# Patient Record
Sex: MALE | Race: WHITE | NOT HISPANIC OR LATINO | Employment: STUDENT | RURAL
[De-identification: names, ages, dates, MRNs, and addresses within clinical notes are randomized per-mention and may not be internally consistent; named-entity substitution may affect disease eponyms.]

---
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
## 2018-10-01 ENCOUNTER — TELEPHONE (OUTPATIENT)
Dept: FAMILY MEDICINE CLINIC | Facility: CLINIC | Age: 18
End: 2018-10-01

## 2018-10-01 NOTE — TELEPHONE ENCOUNTER
He is needing an appt for a physical. 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 Would like to get an appt the end of the week.He has not been here in a long time. Was needing to fill out new Pt  form. She wants to know if she can fill out his form. He is in [place] in school. Also needs the appt for Thurs or Fri

## 2018-10-04 ENCOUNTER — OFFICE VISIT (OUTPATIENT)
Dept: FAMILY MEDICINE CLINIC | Facility: CLINIC | Age: 18
End: 2018-10-04

## 2018-10-04 VITALS
SYSTOLIC BLOOD PRESSURE: 122 MMHG | HEIGHT: 69 IN | OXYGEN SATURATION: 97 % | HEART RATE: 70 BPM | DIASTOLIC BLOOD PRESSURE: 80 MMHG | WEIGHT: 202 LBS | RESPIRATION RATE: 16 BRPM | BODY MASS INDEX: 29.92 KG/M2

## 2018-10-04 DIAGNOSIS — Z00.00 WELLNESS EXAMINATION: Primary | ICD-10-CM

## 2018-10-04 DIAGNOSIS — Z23 NEED FOR VACCINATION: ICD-10-CM

## 2018-10-04 PROCEDURE — 86580 TB INTRADERMAL TEST: CPT | Performed by: FAMILY MEDICINE

## 2018-10-04 PROCEDURE — 99395 PREV VISIT EST AGE 18-39: CPT | Performed by: FAMILY MEDICINE

## 2018-10-04 NOTE — PROGRESS NOTES
"Subjective   Kong Aldana is a 18 y.o. male.     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  No Known Allergies    No past medical history on file.  No past surgical history on file.    Review of Systems   Constitutional: Negative.    HENT: Negative.    Eyes: Negative.    Respiratory: Negative.    Cardiovascular: Negative.    Gastrointestinal: Negative.    Endocrine: Negative.    Genitourinary: Negative.    Musculoskeletal: Negative.    Skin: Negative.    Allergic/Immunologic: Negative.    Neurological: Negative.    Hematological: Negative.    Psychiatric/Behavioral: Negative.        Objective  /80   Pulse 70   Resp 16   Ht 175.3 cm (69\")   Wt 91.6 kg (202 lb)   SpO2 97%   BMI 29.83 kg/m²   Physical Exam   Constitutional: He is oriented to person, place, and time. He appears well-developed and well-nourished.   HENT:   Head: Normocephalic and atraumatic.   Right Ear: External ear normal.   Left Ear: External ear normal.   Nose: Nose normal.   Mouth/Throat: Oropharynx is clear and moist.   Eyes: Pupils are equal, round, and reactive to light. Conjunctivae and EOM are normal.   Neck: Normal range of motion. Neck supple.   Cardiovascular: Normal rate, regular rhythm, normal heart sounds and intact distal pulses.    Pulmonary/Chest: Effort normal and breath sounds normal.   Abdominal: Soft. Bowel sounds are normal.   Musculoskeletal: Normal range of motion.   Neurological: He is alert and oriented to person, place, and time.   Skin: Skin is warm. Capillary refill takes less than 2 seconds.   Psychiatric: He has a normal mood and affect. His behavior is normal. Judgment and thought content normal.   Nursing note and vitals reviewed.      Assessment/Plan   Kong was seen today for establish " care.    Diagnoses and all orders for this visit:    Wellness examination                 No orders of the defined types were placed in this encounter.      Follow up: prn

## 2019-03-22 ENCOUNTER — TELEPHONE (OUTPATIENT)
Dept: FAMILY MEDICINE CLINIC | Facility: CLINIC | Age: 19
End: 2019-03-22

## 2019-03-22 RX ORDER — AMOXICILLIN AND CLAVULANATE POTASSIUM 875; 125 MG/1; MG/1
1 TABLET, FILM COATED ORAL 2 TIMES DAILY
Qty: 20 TABLET | Refills: 0 | OUTPATIENT
Start: 2019-03-22 | End: 2019-12-04

## 2019-12-02 ENCOUNTER — TELEPHONE (OUTPATIENT)
Dept: FAMILY MEDICINE CLINIC | Facility: CLINIC | Age: 19
End: 2019-12-02

## 2019-12-02 NOTE — TELEPHONE ENCOUNTER
Antonette called & said that Kong was seen in [place], [place] and had an U/S.  (the report is in chart)  The report says complex cystic mass w/in subcutaneous tissue of midline in the area of palpable concern.  Also, echogenic liver suggestive of fatty infiltration or hepatocellular disease.  She is req to have you see him this week.

## 2019-12-04 ENCOUNTER — OFFICE VISIT (OUTPATIENT)
Dept: FAMILY MEDICINE CLINIC | Facility: CLINIC | Age: 19
End: 2019-12-04

## 2019-12-04 VITALS
WEIGHT: 222 LBS | HEIGHT: 68 IN | DIASTOLIC BLOOD PRESSURE: 80 MMHG | HEART RATE: 76 BPM | BODY MASS INDEX: 33.65 KG/M2 | OXYGEN SATURATION: 99 % | SYSTOLIC BLOOD PRESSURE: 122 MMHG | RESPIRATION RATE: 16 BRPM

## 2019-12-04 DIAGNOSIS — R22.2 ABDOMINAL WALL MASS: Primary | ICD-10-CM

## 2019-12-04 PROCEDURE — 99213 OFFICE O/P EST LOW 20 MIN: CPT | Performed by: FAMILY MEDICINE

## 2019-12-04 NOTE — PROGRESS NOTES
"Matt Aldana is a 19 y.o. male.     Chief Complaint   Patient presents with   • Follow-up     abdominal U/S        History of Present Illness     notes having a cystic mass on the upper abdomen found on u/s--he notes its been prsent for several weeks---no hx of trama    No current outpatient medications on file.  No Known Allergies    No past medical history on file.  No past surgical history on file.    Review of Systems   Constitutional: Negative.    HENT: Negative.    Eyes: Negative.    Respiratory: Negative.    Cardiovascular: Negative.    Gastrointestinal: Negative.    Endocrine: Negative.    Genitourinary: Negative.    Musculoskeletal: Negative.    Skin: Negative.    Allergic/Immunologic: Negative.    Neurological: Negative.    Hematological: Negative.    Psychiatric/Behavioral: Negative.        Objective  /80   Pulse 76   Resp 16   Ht 172.7 cm (68\")   Wt 101 kg (222 lb)   SpO2 99%   BMI 33.75 kg/m²   Physical Exam   Constitutional: He is oriented to person, place, and time. He appears well-developed and well-nourished.   HENT:   Head: Normocephalic and atraumatic.   Right Ear: External ear normal.   Left Ear: External ear normal.   Nose: Nose normal.   Mouth/Throat: Oropharynx is clear and moist.   Eyes: Conjunctivae and EOM are normal. Pupils are equal, round, and reactive to light.   Neck: Normal range of motion. Neck supple.   Cardiovascular: Normal rate, regular rhythm, normal heart sounds and intact distal pulses.   Pulmonary/Chest: Effort normal and breath sounds normal.   Abdominal: Soft. Bowel sounds are normal.   Musculoskeletal: Normal range of motion.   Neurological: He is alert and oriented to person, place, and time.   Skin: Skin is warm and dry. Capillary refill takes less than 2 seconds.   Psychiatric: He has a normal mood and affect. His behavior is normal. Judgment and thought content normal.   Nursing note and vitals reviewed.  exam does confirm 2cm freely movable " anterior abdominal mass--tender    Assessment/Plan   Kong was seen today for follow-up.    Diagnoses and all orders for this visit:    Abdominal wall mass  -     Ambulatory Referral to General Surgery                 Orders Placed This Encounter   Procedures   • Ambulatory Referral to General Surgery     Referral Priority:   Urgent     Referral Type:   Consultation     Referral Reason:   Specialty Services Required     Requested Specialty:   General Surgery     Number of Visits Requested:   1       Follow up: prn

## 2019-12-20 ENCOUNTER — LAB REQUISITION (OUTPATIENT)
Dept: LAB | Facility: HOSPITAL | Age: 19
End: 2019-12-20

## 2019-12-20 DIAGNOSIS — Z00.00 ENCOUNTER FOR GENERAL ADULT MEDICAL EXAMINATION WITHOUT ABNORMAL FINDINGS: ICD-10-CM

## 2019-12-20 PROCEDURE — 88307 TISSUE EXAM BY PATHOLOGIST: CPT | Performed by: SPECIALIST

## 2019-12-20 PROCEDURE — 88312 SPECIAL STAINS GROUP 1: CPT | Performed by: SPECIALIST

## 2019-12-24 LAB
CYTO UR: NORMAL
LAB AP CASE REPORT: NORMAL
LAB AP CLINICAL INFORMATION: NORMAL
PATH REPORT.FINAL DX SPEC: NORMAL
PATH REPORT.GROSS SPEC: NORMAL

## 2020-06-22 ENCOUNTER — TELEPHONE (OUTPATIENT)
Dept: FAMILY MEDICINE CLINIC | Facility: CLINIC | Age: 20
End: 2020-06-22

## 2020-06-22 RX ORDER — SCOLOPAMINE TRANSDERMAL SYSTEM 1 MG/1
1 PATCH, EXTENDED RELEASE TRANSDERMAL
Qty: 4 EACH | Refills: 0 | Status: SHIPPED | OUTPATIENT
Start: 2020-06-22 | End: 2021-01-18

## 2020-06-22 NOTE — TELEPHONE ENCOUNTER
Caller: Wagner Aldana    Relationship: Father    Best call back number: [phone]    What medication are you requesting:   Patches that patient believes are Dramamine patches     What are your current symptoms:   N/a precaution for upcoming trip    How long have you been experiencing symptoms:   n/a    Have you had these symptoms before:    [x] Yes  [] No    Have you been treated for these symptoms before:   [x] Yes  [] No    If a prescription is needed, what is your preferred pharmacy and phone number:      [facility]    Additional notes:  Patient said he is going on vacation and was provided these patches which helped and patient wanted to request these again.

## 2021-01-15 ENCOUNTER — CLINICAL SUPPORT (OUTPATIENT)
Dept: FAMILY MEDICINE CLINIC | Facility: CLINIC | Age: 21
End: 2021-01-15

## 2021-01-15 DIAGNOSIS — Z00.00 WELLNESS EXAMINATION: Primary | ICD-10-CM

## 2021-01-15 LAB
INDURATION: 0 MM (ref 0–10)
Lab: NORMAL
Lab: NORMAL
TB SKIN TEST: NORMAL

## 2021-01-15 PROCEDURE — 86580 TB INTRADERMAL TEST: CPT | Performed by: FAMILY MEDICINE

## 2021-01-15 NOTE — PROGRESS NOTES
TB skin test done as ordered on the right fore arm. Pt was told to return Monday morning to reading of the site.

## 2021-01-18 ENCOUNTER — OFFICE VISIT (OUTPATIENT)
Dept: FAMILY MEDICINE CLINIC | Facility: CLINIC | Age: 21
End: 2021-01-18

## 2021-01-18 VITALS
OXYGEN SATURATION: 98 % | WEIGHT: 226 LBS | BODY MASS INDEX: 34.25 KG/M2 | TEMPERATURE: 97.4 F | HEART RATE: 65 BPM | RESPIRATION RATE: 16 BRPM | DIASTOLIC BLOOD PRESSURE: 80 MMHG | HEIGHT: 68 IN | SYSTOLIC BLOOD PRESSURE: 118 MMHG

## 2021-01-18 DIAGNOSIS — Z02.89 ENCOUNTER FOR PHYSICAL EXAMINATION RELATED TO EMPLOYMENT: Primary | ICD-10-CM

## 2021-01-18 PROCEDURE — 99395 PREV VISIT EST AGE 18-39: CPT | Performed by: NURSE PRACTITIONER

## 2021-01-18 NOTE — PROGRESS NOTES
Subjective   Chief Complaint:  Work Physical    History of Present Illness:  This 20 y.o. male was seen in the office today.  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  He is in college for teaching.  No PMSH, no high risk behaviors.      No Known Allergies   Current Outpatient Medications on File Prior to Visit   Medication Sig   • [DISCONTINUED] Scopolamine (Transderm-Scop, 1.5 MG,) 1.5 MG/3DAYS patch Place 1 patch on the skin as directed by provider Every 72 (Seventy-Two) Hours.     No current facility-administered medications on file prior to visit.       Past Medical, Surgical, Social, and Family History:  History reviewed. No pertinent past medical history.  History reviewed. No pertinent surgical history.  Social History     Socioeconomic History   • Marital status: Single     Spouse name: Not on file   • Number of children: Not on file   • Years of education: Not on file   • Highest education level: Not on file   Tobacco Use   • Smoking status: Never Smoker   • Smokeless tobacco: Never Used     Review of Systems   Constitutional: Negative for chills, diaphoresis, fatigue and fever.   HENT: Negative for congestion, ear pain, sinus pressure and sinus pain.    Eyes: Negative for pain, discharge, redness and itching.   Respiratory: Negative for cough, shortness of breath and wheezing.    Cardiovascular: Negative for chest pain, palpitations and leg swelling.   Gastrointestinal: Negative for abdominal distention, abdominal pain, constipation, diarrhea and nausea.   Endocrine: Negative for cold intolerance and heat intolerance.   Genitourinary: Negative for difficulty urinating, dysuria, flank pain, frequency and urgency.   Musculoskeletal: Negative for arthralgias and myalgias.   Skin: Negative for rash and wound.   Allergic/Immunologic: Negative for environmental allergies.   Neurological: Negative for dizziness, weakness, numbness and headaches.   Hematological: Negative for adenopathy. Does not  "bruise/bleed easily.   Psychiatric/Behavioral: Negative for agitation, behavioral problems, sleep disturbance and suicidal ideas.       History reviewed. No pertinent family history.  Objective   Physical Exam  Vitals signs and nursing note reviewed.   Constitutional:       General: He is not in acute distress.     Appearance: He is not diaphoretic.   HENT:      Head: Normocephalic and atraumatic.      Nose: Nose normal.   Eyes:      Conjunctiva/sclera: Conjunctivae normal.      Pupils: Pupils are equal, round, and reactive to light.   Neck:      Musculoskeletal: Normal range of motion and neck supple.      Thyroid: No thyromegaly.      Vascular: No JVD.      Trachea: No tracheal deviation.   Cardiovascular:      Rate and Rhythm: Normal rate and regular rhythm.      Heart sounds: Normal heart sounds. No murmur. No friction rub. No gallop.    Pulmonary:      Effort: Pulmonary effort is normal. No respiratory distress.      Breath sounds: Normal breath sounds. No wheezing.   Abdominal:      General: Bowel sounds are normal.      Palpations: Abdomen is soft.      Tenderness: There is no abdominal tenderness. There is no guarding.   Musculoskeletal: Normal range of motion.         General: No tenderness or deformity.   Lymphadenopathy:      Cervical: No cervical adenopathy.   Skin:     General: Skin is warm and dry.      Capillary Refill: Capillary refill takes less than 2 seconds.   Neurological:      Mental Status: He is alert and oriented to person, place, and time.   Psychiatric:         Behavior: Behavior normal.         Thought Content: Thought content normal.         Judgment: Judgment normal.     /80   Pulse 65   Temp 97.4 °F (36.3 °C) (Tympanic)   Resp 16   Ht 172.7 cm (68\")   Wt 103 kg (226 lb)   SpO2 98%   BMI 34.36 kg/m²     Assessment/Plan   Diagnoses and all orders for this visit:    1. Encounter for physical examination related to employment (Primary)    Discussion:  Advised and educated plan " of care.  No disqualifying factors for employment.    Anticipatory guidance: Diet-discussed lose it mary anne, safety, need for lipid monitoring in 20s as a baseline, health while away to college-binge drinking, substance use.    Follow-up:  Return if symptoms worsen or fail to improve.    Note e-Signed by LUNA Shore on 01/18/2021 at 08:31 CST

## 2021-08-05 ENCOUNTER — TELEPHONE (OUTPATIENT)
Dept: FAMILY MEDICINE CLINIC | Facility: CLINIC | Age: 21
End: 2021-08-05

## 2021-08-11 ENCOUNTER — OFFICE VISIT (OUTPATIENT)
Dept: FAMILY MEDICINE CLINIC | Facility: CLINIC | Age: 21
End: 2021-08-11

## 2021-08-11 VITALS — BODY MASS INDEX: 34.1 KG/M2 | HEIGHT: 68 IN | WEIGHT: 225 LBS | HEART RATE: 70 BPM | RESPIRATION RATE: 18 BRPM

## 2021-08-11 DIAGNOSIS — B07.0 PLANTAR WART OF BOTH FEET: Primary | ICD-10-CM

## 2021-08-11 PROCEDURE — 99214 OFFICE O/P EST MOD 30 MIN: CPT | Performed by: NURSE PRACTITIONER

## 2021-08-11 PROCEDURE — 17110 DESTRUCTION B9 LES UP TO 14: CPT | Performed by: NURSE PRACTITIONER

## 2021-08-11 NOTE — PROGRESS NOTES
"Subjective   Chief Complaint:  Plantar wart treatment    History of Present Illness:  This 21 y.o. male was seen in the office today.  He presents today for treatment of plantar warts.  He presents with bilateral feet plantar warts cute onset several months ago.    No Known Allergies   No current outpatient medications on file prior to visit.     No current facility-administered medications on file prior to visit.      Past Medical, Surgical, Social, and Family History:  History reviewed. No pertinent past medical history.  History reviewed. No pertinent surgical history.  Social History     Socioeconomic History   • Marital status: Single     Spouse name: Not on file   • Number of children: Not on file   • Years of education: Not on file   • Highest education level: Not on file   Tobacco Use   • Smoking status: Never Smoker   • Smokeless tobacco: Never Used     History reviewed. No pertinent family history.  Objective   Physical Exam  Constitutional:       General: He is not in acute distress.  Cardiovascular:      Rate and Rhythm: Normal rate and regular rhythm.   Pulmonary:      Effort: Pulmonary effort is normal.      Breath sounds: Normal breath sounds.   Musculoskeletal:        Feet:    Feet:      Comments: Left foot - pared and currette used as marked  Right foot - cryo used as marked  Neurological:      Mental Status: He is alert.     Pulse 70   Resp 18   Ht 172.7 cm (68\")   Wt 102 kg (225 lb)   BMI 34.21 kg/m²     Assessment/Plan   Diagnoses and all orders for this visit:    1. Plantar wart of both feet (Primary)    Discussion:  Advised and educated plan of care.  Advised can try zinc and vitamin C to help boost immune system over the next couple weeks.  We will see him back in 2weeks for retreatment for residual lesions.    Combination of cryotherapy used on the smaller lesions, larger lesions were pard with a 10 blade scalpel and scraped out with a 2 mm curette.  He tolerated the procedure well.  He " had premedicated with strawberry [place] BLT cream, did not report any pain during the procedure.  0 blood loss.    Time =35 minutes.    Follow-up:  Return in about 2 weeks (around 8/25/2021) for Re-treatment - 30 minute slot.    Electronically signed by LUNA Shore, 08/11/21, 10:07 AM CDT.

## 2021-09-22 ENCOUNTER — TELEPHONE (OUTPATIENT)
Dept: FAMILY MEDICINE CLINIC | Facility: CLINIC | Age: 21
End: 2021-09-22

## 2021-09-22 NOTE — TELEPHONE ENCOUNTER
Telephone: Patient requesting a copy of the last physical we did-advised patient he can come by office and  a copy.

## 2022-05-30 ENCOUNTER — DOCUMENTATION (OUTPATIENT)
Dept: FAMILY MEDICINE CLINIC | Facility: CLINIC | Age: 22
End: 2022-05-30

## 2022-05-30 RX ORDER — AMOXICILLIN AND CLAVULANATE POTASSIUM 875; 125 MG/1; MG/1
1 TABLET, FILM COATED ORAL 2 TIMES DAILY
Qty: 20 TABLET | Refills: 0 | Status: SHIPPED | OUTPATIENT
Start: 2022-05-30 | End: 2022-06-09

## 2022-05-30 RX ORDER — METHYLPREDNISOLONE 4 MG/1
TABLET ORAL
Qty: 1 EACH | Refills: 0 | Status: SHIPPED | OUTPATIENT
Start: 2022-05-30

## 2022-05-31 NOTE — PROGRESS NOTES
Weekend-going out of town-reports sore throat, upper respiratory symptoms-antibiotic and steroid sent.  Cotreatment family -all of which tested negative for COVID thus far.    Electronically signed by LUNA Shore, 05/31/22, 7:24 AM CDT.

## 2022-08-17 RX ORDER — TRIAMCINOLONE ACETONIDE 5 MG/G
1 CREAM TOPICAL 2 TIMES DAILY
Qty: 15 G | Refills: 0 | Status: SHIPPED | OUTPATIENT
Start: 2022-08-17

## 2022-08-17 NOTE — PROGRESS NOTES
Telephone: Reports insect bite that is having difficulty healing and increased itching-steroid cream sent in.  Advised follow-up as needed.    Electronically signed by LUNA Shore, 08/17/22, 10:37 AM CDT.

## 2023-08-19 ENCOUNTER — E-VISIT (OUTPATIENT)
Dept: FAMILY MEDICINE CLINIC | Facility: TELEHEALTH | Age: 23
End: 2023-08-19
Payer: COMMERCIAL

## 2023-08-19 ENCOUNTER — TELEMEDICINE (OUTPATIENT)
Dept: FAMILY MEDICINE CLINIC | Facility: TELEHEALTH | Age: 23
End: 2023-08-19
Payer: COMMERCIAL

## 2023-08-19 DIAGNOSIS — J03.90 TONSILLITIS: Primary | ICD-10-CM

## 2023-08-19 RX ORDER — AMOXICILLIN 500 MG/1
500 CAPSULE ORAL 2 TIMES DAILY
Qty: 20 CAPSULE | Refills: 0 | Status: SHIPPED | OUTPATIENT
Start: 2023-08-19 | End: 2023-08-29

## 2023-08-19 NOTE — PROGRESS NOTES
Chief Complaint   Patient presents with    Sore Throat    Earache       Video Visit Reason:   Free Text Description: Sore throat, ear pain when I swallow  Subjective   Kong Aldana is a 23 y.o. male.     History of Present Illness  Sore throat, worse on the right with right sided ear pain for 2 days. He has not felt feverish. He has had strep throat before and says this feels exactly like that. He has no shortness of breath or wheezing.  Sore Throat   This is a new problem. Episode onset: 2 days. The problem has been gradually worsening. The pain is moderate. Associated symptoms include ear pain. Pertinent negatives include no congestion, coughing, ear discharge, hoarse voice, plugged ear sensation, neck pain, shortness of breath, swollen glands or trouble swallowing (painful swallowing). He has had no exposure to strep.   Earache   There is pain in the right ear. The pain is moderate. Associated symptoms include a sore throat. Pertinent negatives include no coughing, ear discharge, neck pain or rhinorrhea. He has tried acetaminophen for the symptoms. The treatment provided no relief.     The following portions of the patient's history were reviewed and updated as appropriate: allergies, current medications, past medical history, and problem list.      History reviewed. No pertinent past medical history.  Social History     Socioeconomic History    Marital status:    Tobacco Use    Smoking status: Never    Smokeless tobacco: Never     medication documentation: reviewed and updated with patient and   Current Outpatient Medications:     amoxicillin (AMOXIL) 500 MG capsule, Take 1 capsule by mouth 2 (Two) Times a Day for 10 days., Disp: 20 capsule, Rfl: 0  Review of Systems   Constitutional:  Negative for chills and fever.   HENT:  Positive for ear pain and sore throat. Negative for congestion, ear discharge, hoarse voice, postnasal drip, rhinorrhea, sinus pressure, sinus pain, trouble swallowing (painful  swallowing) and voice change.    Respiratory:  Negative for cough and shortness of breath.    Musculoskeletal:  Negative for neck pain.   Allergic/Immunologic: Negative for environmental allergies.     Objective   Physical Exam  Constitutional:       General: He is not in acute distress.     Appearance: He is not ill-appearing.   HENT:      Mouth/Throat:      Pharynx: Uvula midline. Posterior oropharyngeal erythema present. No oropharyngeal exudate.      Tonsils: 2+ on the right. 2+ on the left.      Comments: Tonsillar erythema, significant  Lymphadenopathy:      Head:      Right side of head: No tonsillar adenopathy.      Left side of head: No tonsillar adenopathy.      Cervical: No cervical adenopathy (patient guided exam).   Neurological:      Mental Status: He is alert.       Assessment & Plan   Diagnoses and all orders for this visit:    1. Tonsillitis (Primary)  -     amoxicillin (AMOXIL) 500 MG capsule; Take 1 capsule by mouth 2 (Two) Times a Day for 10 days.  Dispense: 20 capsule; Refill: 0                    Follow Up:  If your symptoms are not resolving by the completion of your treatment or are worsening, see your primary care provider for follow up. If you don't have a primary care provider, you may go to any Urgent Care for re-evaluation. If you develop any life threatening symptoms, go to the nearest Emergency Department immediately or call EMS.               The use of  Video Visit was utilized during this visit, using both Fairphone and Twilio/Epic. The use of a video visit has been reviewed with the patient and verbal informed consent has been obtained. No technical difficulties occurred during the visit.    is located at [address] [place] [address]  Provider is located at [place], [place]

## 2023-08-19 NOTE — E-VISIT ESCALATED
Patient escalated   Provider LUNA Mcallister chose to escalate patient to another level of care because: Patient's free text comments   Additional Details: thinks he has strep throat, no fever, needs throat assessed   Patient was sent the following message:   Based on the information you've provided us, you need to take another step to get care. You mentioned that you think that you have Strep throat, however, you do not have a fever or any other symptoms. A provider needs to look at your throat. Sign in for a video visit so that your throat can be assessed.   What to do now:   Setup a video visit   Please, schedule your video visit   Video visit     You won't be charged for your eVisit. If you paid with a credit card, the charge will be reversed.   Chief Complaint: Cold, flu, COVID, sinus, hay fever, or seasonal allergies   Patient introduction   Patient is 23-year-old male with sore throat that started 3 to 5 days ago. Regarding date of symptom onset, patient writes: 05/16/2023.   COVID-19 testing history, vaccination status, and exposure:    Has not been tested for COVID-19 since symptom onset.    Patient was prompted to take a self-test during the interview, but does not have a COVID-19 test kit.    Received 2 doses of the COVID-19 vaccine (Moderna, Moderna). Received their most recent dose of the vaccine more than 14 days ago.    No known exposure to a person with a confirmed or suspected case of COVID-19.    No travel outside local community within the last 14 days.   Risk factors for severe disease from COVID-19 infection    BMI >= 25.   General presentation   Symptoms came on gradually.   Fever:    No fever.   Sinus and nasal symptoms:    Sinus pain or pressure on or around the forehead.    Patient first noticed sinus pain less than 5 days ago.    Sinus pain is not worse with Valsalva.    No nasal or sinus congestion.    No nasal discharge.    No itchy nose or sneezing.    No history of unhealed nasal  septal ulcer/nasal wound.    No history of antibiotic treatment for sinus infection in the last year.    No history of deviated septum or nasal polyps.   Throat symptoms:    Sore throat. Pain is bilateral but worse on the right side.    No known recent strep exposure.    Patient thinks they have strep.    Has discomfort when swallowing but can swallow liquids and solid foods.   Head and body aches:    No headache.    No sweats.    No chills.    No myalgia.    No fatigue.   Cough:    No cough.   Wheezing and shortness of breath:    No COPD diagnosis.    No asthma diagnosis.    No wheezing.    No shortness of breath.   Chest pain:    No chest pain.   Ear symptoms:    Current symptoms include pain in the ear(s).   Dizziness:    No dizziness.   Allergies:    No history of allergies.   Flu exposure:    No recent known exposure to a person with a confirmed flu diagnosis.    Has not had a flu vaccine this season.   Patient is taking over-the-counter medications for current symptoms, including acetaminophen.   Review of red flags/alarm symptoms:    No changes in alertness or awareness.    No nuchal rigidity.    No symptoms suggesting airway obstruction.    No decreased urination.   Self-exam:    Height: 175 centimeters    Weight: 99.7 kilograms    No difficulty moving their chin toward their chest.    No tonsillar edema.    Purulent tonsils.    No palatal petechiae.    Neck lymph nodes feel normal.   Recent antibiotic use:    Has not taken antibiotics for similar symptoms within the past month.   Current medications   Not taking other medications or supplements.   Medication allergies   None.   Medication contraindication review   No history of anaphylactic reaction to beta-lactam antibiotics; aspirin triad; blood dyscrasia; bone marrow depression; catecholamine-releasing paraganglioma; coronary artery disease; coagulation disorder; congenital long QT syndrome; depression; electrolyte abnormalities; fungal infection; GI  bleeding; GI obstruction; G6PD deficiency; heart arrhythmia; hypertension; mononucleosis; myasthenia; recent myocardial infarction; NSAID-induced asthma/urticaria; Parkinson's disease; pheochromocytoma; porphyria; Reye syndrome; seizure disorder; ulcerative colitis; and urinary retention.   No history of metoclopramide-associated dystonic reaction and tardive dyskinesia.   No known history of amoxicillin-clavulanate-associated cholestatic jaundice or hepatic impairment.   No known history of azithromycin-associated cholestatic jaundice or hepatic impairment.   Past medical history   Immune conditions: No immunocompromising conditions.   No history of cancer.   Social history   Never smoked tobacco.   Patient did not request an excuse note.   Assessment:   Patient determined to need a level of care not appropriate to be delivered through eVisit.   Plan:   Patient informed of need to seek in-person care   ----------   Electronically signed by LUNA Mcallister on 2023-08-19 at 15:50PM   ----------   Patient Interview Transcript:   Which of these symptoms are bothering you? Select all that apply.    Sore throat   Not selected:    Cough    Shortness of breath    Stuffed-up nose or sinuses    Runny nose    Itchy or watery eyes    Itchy nose or sneezing    Loss of smell or taste    Hoarse voice or loss of voice    Headache    Fever    Sweats    Chills    Muscle or body aches    Fatigue or tiredness    Nausea or vomiting    Diarrhea    I don't have any of these symptoms   When did your current symptoms start? Select one.    3 to 5 days ago   Not selected:    Less than 48 hours ago    6 to 9 days ago    10 to 14 days ago    2 to 3 weeks ago    3 to 4 weeks ago    More than a month ago   Do you know the exact date your symptoms started? If so, enter the date as MM/DD/YY. Select one.    Yes (specify): 05/16/2023   Not selected:    No   Did your symptoms come on suddenly or gradually? Select one.    Gradually   Not  "selected:    Suddenly    I'm not sure   Since your current symptoms started, have you had a viral test for COVID-19? - This includes home self-tests as well as nose swab or saliva tests done at a doctor's office, lab, or testing site. - It does NOT include antibody tests, which use a blood sample to test for past infection. Select one.    No   Not selected:    Yes   Taking a home COVID test can help your provider give you the best care. If you have a COVID test kit, take the test now before continuing with this interview. Do you have a COVID test kit? Select one.    No, I don't have a test kit   Not selected:    Yes, and I'll take a test now    Yes, but I prefer not to take a test now   Have you gotten the COVID-19 vaccine? Select one.    Yes   Not selected:    No   How many total doses of the COVID-19 vaccine have you gotten? This includes boosters as well as additional doses for those who are immunocompromised. Select one.    2 doses   Not selected:    1 dose    3 doses    4 doses    5 doses   1st dose    Moderna   Not selected:    J&J/Buzz    Novavax    Pfizer   2nd dose    Moderna   Not selected:    J&J/Buzz    Novavax    Pfizer   When did you get your most recent dose of the COVID-19 vaccine?    More than 14 days ago   Not selected:    Less than 48 hours (2 days) ago    48 to 72 hours (3 days) ago    3 to 5 days ago    5 to 7 days ago    7 to 14 days ago   In the last 14 days, have you traveled outside of your local community? This includes travel by car, RV, bus, train, or plane. Travel increases your chances of getting and spreading COVID-19. Select one.    No   Not selected:    Yes   In the last 14 days, have you had close contact with someone who has coronavirus (COVID-19)? \"Close contact\" means any of these: - Living in the same household as someone with COVID-19. - Caring for someone with COVID-19. - Being within 6 feet of someone with COVID-19 for a total of at least 15 minutes over a 24-hour " period. For example, three 5-minute exposures for a total of 15 minutes. - Being in direct contact with respiratory droplets from someone with COVID-19 (being coughed on, kissing, sharing utensils). Select one.    No, not that I know of   Not selected:    Yes, a confirmed case    Yes, a suspected case   Do you feel sinus pain or pressure in any of these areas?    In my forehead   Not selected:    Around my eyes    Behind my nose    In my cheeks    In my upper teeth or jaw    No   When did you first notice your sinus pain or pressure? Select one.    Less than 5 days ago   Not selected:    5 to 9 days ago    10 to 14 days ago    2 to 4 weeks ago    1 month ago or longer   Does coughing, sneezing, or leaning forward make your sinuses feel worse? Select one.    No   Not selected:    Yes   Can you swallow liquids and solid foods? A sore throat may be painful when swallowing, but it shouldn't prevent you from swallowing. Select one.    Yes, but it's uncomfortable   Not selected:    Yes, with ease    Yes, but it's painful    It's hard to swallow anything because it feels like liquids and food get stuck in my throat    No, I can't swallow anything, liquid or solid foods   Is your throat pain worse on one side than the other? Select one.    Yes, it's worse on the right side   Not selected:    Yes, it's worse on the left side    No, it's the same on both sides   Which of these best describes your throat pain? Select one.    Pain on both sides, but worse on the right   Not selected:    Severe pain on the right, and little to no pain on the left   Since your symptoms started, have you felt dizzy? Select one.    No   Not selected:    Yes, but I can continue with my regular daily activities    Yes, and it makes it hard to stand, walk, or do daily activities   Do you have chest pain? You might also feel it as discomfort, aching, tightness, or squeezing in the chest. Select one.    No   Not selected:    Yes   Have you urinated at  least 3 times in the last 24 hours? Select one.    Yes   Not selected:    No   Changes in alertness or awareness may mean you need emergency care. Since your symptoms started, have you had any of these? Select all that apply.    None of the above   Not selected:    Confusion    Slurred speech    Not knowing where you are or what day it is    Difficulty staying conscious    Fainting or passing out   Do your symptoms include a whistling sound, or wheezing, when you breathe? Select one.    No   Not selected:    Yes    I'm not sure   Do you have any of these symptoms in your ear(s)? Select all that apply.    Pain   Not selected:    Pressure    Fullness    Crackling or popping    Plugged or blocked sensation    None of the above   Can you move your chin toward your chest?    Yes   Not selected:    No, my neck is too stiff   Are your tonsils larger than usual?    No, not that I can tell   Not selected:    Yes    I've had my tonsils removed   Is there any white or yellow pus on your tonsils?    Yes   Not selected:    No, not that I can see   Are there red spots on the roof of your mouth or the back of your throat?    No, not that I can see   Not selected:    Yes   Are your glands/lymph nodes swollen, or does it hurt when you touch them?    No, not that I can tell   Not selected:    Yes   In the past 2 weeks, has anyone around you (such as at school, work, or home) had a confirmed diagnosis of strep throat? A confirmed diagnosis means that a throat swab and lab test were done to verify a strep throat infection. Select one.    No, not that I know of   Not selected:    Yes   Do you think you might have strep throat? Select one.    Yes   Not selected:    No   In the past week, has anyone around you (such as at school, work, or home) had a confirmed diagnosis of the flu? A confirmed diagnosis means that a nose swab was done to verify a flu infection. Select all that apply.    No, not that I know of   Not selected:    I live  with someone who has the flu    I've been within touching distance of someone who has the flu    I've walked by, or sat about 3 feet away from, someone who has the flu    I've been in the same building as someone who has the flu   Have you ever been diagnosed with asthma? Select one.    No   Not selected:    Yes   Have you ever been diagnosed with chronic obstructive pulmonary disease (COPD)? Select one.    No, not that I know of   Not selected:    Yes   In the past month, have you taken antibiotics for similar symptoms? Examples of antibiotics include amoxicillin, amoxicillin-clavulanate (Augmentin), penicillin, cefdinir (Omnicef), doxycycline, and clindamycin (Cleocin). Select one.    No   Not selected:    Yes (specify)   In the last year, how many times were you treated with antibiotics for a sinus infection? Select one.    None   Not selected:    1 to 3 times    4 or more times   Have you been diagnosed with a deviated septum or nasal polyps? The nose is divided into two nostrils by the septum. A crooked septum is called a deviated septum. Nasal polyps are growths inside the nose or sinuses. Select one.    No, not that I know of   Not selected:    Yes, but I had surgery to treat them    Yes, I have a deviated septum    Yes, I have nasal polyps    Yes, I have a deviated septum and nasal polyps   Do you have a sore inside your nose that won't heal? Select one.    No, not that I know of   Not selected:    Yes   Do you have allergies (pollen, dust mites, mold, animal dander)? Select one.    No, not that I know of   Not selected:    Yes   Have you had a flu shot this season? Select one.    No   Not selected:    Yes, less than 2 weeks ago    Yes, 2 to 4 weeks ago    Yes, 1 to 3 months ago    Yes, 3 to 6 months ago    Yes, more than 6 months ago   The flu and COVID-19 can be more serious for people in certain groups. The next few questions help us figure out if you or anyone you live with is at higher risk for  complications from these infections. Do any of these statements apply to you? Select all that apply.    None of the above   Not selected:    I'm     I'm     I'm Black    I'm  or    Do you smoke tobacco? Select one.    No   Not selected:    Yes, every day    Yes, some days    No, I quit   Do you have any of these conditions? Select all that apply.    None of the above   Not selected:    Chronic lung disease, such as cystic fibrosis or interstitial fibrosis    Heart disease, such as congenital heart disease, congestive heart failure, or coronary artery disease    Disorder of the brain, spinal cord, or nerves and muscles, such as dementia, cerebral palsy, epilepsy, muscular dystrophy, or developmental delay    Metabolic disorder or mitochondrial disease    Cerebrovascular disease, such as stroke or another condition affecting the blood vessels or blood supply to the brain    Down syndrome    Mood disorder, including depression or schizophrenia spectrum disorders    Substance use disorder, such as alcohol, opioid, or cocaine use disorder    Tuberculosis   Do you live in a group care setting? Examples include: - Nursing home - Residential care - Psychiatric treatment facility - [facility] - Homeless shelter - Foster care setting Select one.    No   Not selected:    Yes   Are you a healthcare worker? Select one.    No   Not selected:    Yes   People with a very high body mass index (BMI) are at higher risk for developing complications from the flu and severe illness from COVID-19. To determine your BMI, we need to know your weight and height. Please enter your weight (in pounds).    Weight   Please enter your height.    Height   Do you have any of these conditions that can affect the immune system? Scroll to see all options. Select all that apply.    None of these   Not selected:    History of bone marrow transplant    Chronic kidney disease     Chronic liver disease (including cirrhosis)    HIV/AIDS    Inflammatory bowel disease (Crohn's disease or ulcerative colitis)    Lupus    Moderate to severe plaque psoriasis    Multiple sclerosis    Rheumatoid arthritis    Sickle cell anemia    Alpha or beta thalassemia    History of solid organ transplant (kidney, liver, or heart)    History of spleen removal    An autoimmune disorder not listed here (specify)    A condition requiring treatment with long-term use of oral steroids (such as prednisone, prednisolone, or dexamethasone) (specify)   Have you ever been diagnosed with cancer? Select one.    No   Not selected:    Yes, I have cancer now    Yes, but I'm in remission   Do any of these apply to you? Select all that apply.    None of the above   Not selected:    I've been hospitalized within the last 5 days    I have diabetes    I'm in close contact with a child in    The flu and COVID-19 can be more serious for people in certain groups. Do any of these apply to the people who live with you? Select all that apply.    None of the above   Not selected:    Under age 5    Over age 65            Black     or     Pregnant    Has given birth, had a miscarriage, had a pregnancy loss, or had an  in the last 2 weeks   Does any member of your household have any of these medical conditions? Select all that apply.    None of the above   Not selected:    Asthma    Disorders of the brain, spinal cord, or nerves and muscles, such as dementia, cerebral palsy, epilepsy, muscular dystrophy, or developmental delay    Chronic lung disease, such as COPD or cystic fibrosis    Heart disease, such as congenital heart disease, congestive heart failure, or coronary artery disease    Cerebrovascular disease, such as stroke or another condition affecting the blood vessels or blood supply to the brain    Blood disorders, such as sickle cell disease    Diabetes    Metabolic disorders  such as inherited metabolic disorders or mitochondrial disease    Kidney disorders    Liver disorders    Weakened immune system due to illness or medications such as chemotherapy or steroids    Children under the age of 19 who are on long-term aspirin therapy    Extreme obesity (BMI > 40)   Do you have any of these conditions? Scroll to see all options. Select all that apply.    None of the above   Not selected:    Aspirin triad (also known as Samter's triad or ASA triad)    Asthma or hives from taking aspirin or other NSAIDs, such as ibuprofen or naproxen    Blockage or narrowing of the blood vessels of the heart    Blood clotting disorder    Blood dyscrasia, such anemia, leukemia, lymphoma, or myeloma    Bone marrow depression    Catecholamine-releasing paraganglioma    Congenital long QT syndrome    Depression    Difficulty urinating or completely emptying your bladder    Uncorrected electrolyte abnormalities    Fungal infection    Gastrointestinal (GI) bleeding    Gastrointestinal (GI) obstruction    G6PD deficiency    Recent heart attack    High blood pressure    Irregular heartbeat or heart rhythm    Mononucleosis (mono)    Myasthenia gravis    Parkinson's disease    Pheochromocytoma    Reye syndrome    Seizure disorder    Thyroid disease    Ulcerative colitis   Have you ever had either of these conditions? Select all that apply.    No   Not selected:    Metoclopramide-associated dystonic reaction    Tardive dyskinesia   Just a few more questions about medications, and then you're finished. Have you used any non-prescription medications or nasal sprays for your current symptoms? Examples include saline sprays, decongestants, NyQuil, and Tylenol. Select one.    Yes   Not selected:    No   Which of these non-prescription medications have you tried? Scroll to see all options. Select all that apply.    Acetaminophen (Tylenol)   Not selected:    Budesonide (Rhinocort)    Cetirizine (Zyrtec)    Chlorpheniramine  (Aller-chlor, Chlor-Trimeton)    Cromolyn (NasalCrom)    Dextromethorphan (Delsym, Robitussin, Vicks DayQuil Cough)    Diphenhydramine (Benadryl)    Fexofenadine (Allegra)    Fluticasone (Flonase)    Guaifenesin (Mucinex)    Guaifenesin/dextromethorphan (Delsym DM, Mucinex DM, Robitussin DM)    Ibuprofen (Advil, Motrin, Midol)    Ketotifen (Alaway, Zaditor)    Loratadine (Alavert, Claritin)    Naphazoline-pheniramine (Naphcon-A, Opcon-A, Visine-A)    Omeprazole (Prilosec)    Oxymetazoline (Afrin)    Phenylephrine (Sudafed PE)    Triamcinolone (Nasacort)    None of the above   Have you taken any monoamine oxidase inhibitor (MAOI) medications in the last 14 days? Examples include rasagiline (Azilect), selegiline (Eldepryl, Zelapar), isocarboxazid (Marplan), phenelzine (Nardil), and tranylcypromine (Parnate). Select one.    No, not that I know of   Not selected:    Yes   Do you take Kynmobi or Apokyn (apomorphine)? Select one.    No   Not selected:    Yes   Are you taking any other medications, vitamins, or supplements? Select one.    No   Not selected:    Yes   Have you ever had an allergic or bad reaction to any medication? Select one.    No   Not selected:    Yes   Are you allergic to milk or to the proteins found in milk (for example, whey or casein)? A milk allergy is different from lactose intolerance. Select one.    No, not that I know of   Not selected:    Yes   Have you ever had jaundice or liver problems as a result of taking amoxicillin-clavulanate (Augmentin)? Jaundice is a condition in which the skin and the whites of the eyes turn yellow. Select all that apply.    No, not that I know of   Not selected:    Yes, jaundice    Yes, liver problems   Have you ever had jaundice or liver problems as a result of taking azithromycin (Zithromax, Zmax)? Jaundice is a condition in which the skin and the whites of the eyes turn yellow. Select all that apply.    No, not that I know of   Not selected:    Yes, jaundice     Yes, liver problems   Do you need a doctor's note? A doctor's note confirms that you received care today and states when you can return to school or work. It does not contain information about your diagnosis or treatment plan. Your provider will make the final decision on whether to give you a doctor's note and for how long. Doctor's notes CANNOT be backdated. We can't provide medical leave paperwork through this type of visit. If more paperwork is needed to request time off, contact your primary care provider. Select one.    No   Not selected:    Today only (1 day)    Today and tomorrow (2 days)    3 days    5 days    7 days    10 days    14 days   Is there anything you'd like to add about your symptoms? Please limit your comments to the symptoms asked about in this interview. If you include comments about other concerns, your provider may recommend that you be seen in person.   The patient did not enter any additional information.   ----------   Medical history   Medical history data does not currently exist for this patient.

## 2023-08-19 NOTE — PATIENT INSTRUCTIONS
Tonsillitis    Tonsillitis is an infection of the throat. Tonsils are tissues in the back of your throat. This infection causes the tonsils to become red, tender, and swollen.  What are the causes?  Tonsillitis is caused by germs (bacteria or a virus). This condition can also occur when pieces of food and bacteria build up around the tonsils.  Tonsillitis that is caused by germs can spread from person to person.  What are the signs or symptoms?  A sore throat.  Trouble swallowing.  White patches on the tonsils.  Swollen tonsils.  Fever.  Headache.  Tiredness.  Not feeling hungry.  Snoring during sleep when you did not snore before.  Foul-smelling, yellowish-white pieces of material that you cough up or spit out. These can cause bad breath.  How is this treated?  Medicines. These can be given to treat pain, swelling, or fever. They can also be given to kill bacteria.  Surgery to take out the tonsils. This is done if you have very bad infections that do not go away.  Follow these instructions at home:  Medicines  Take over-the-counter and prescription medicines only as told by your doctor.  If you were prescribed an antibiotic medicine, take it as told by your doctor. Do not stop taking the antibiotic even if you start to feel better.  Eating and drinking  Drink enough fluid to keep your pee (urine) pale yellow.  While your throat is sore, eat soft or liquid foods, such as:  Soup.  Sherbet.  Soft, warm cereals, such as oatmeal or hot wheat cereal.  Drink warm fluids.  Eat frozen ice pops.  General instructions  Rest as much as you can, and get plenty of sleep.  Rinse your mouth often with salt water.  To make salt water, dissolve «-1 tsp (3-6 g) of salt in 1 cup (237 mL) of warm water.  Do not swallow the salt water.  Wash your hands often with soap and water for at least 20 seconds. If there is no soap and water, use hand .  Do not share cups, bottles, or other utensils until your symptoms are gone.  Do not  smoke or use any products that contain nicotine or tobacco. If you need help quitting, ask your doctor.  Keep all follow-up visits.  Contact a doctor if:  You have large, tender lumps in your neck that are new.  You have a fever that does not go away after 2-3 days.  You have a rash.  You cough up green, yellow-brown, or bloody fluid.  You cannot swallow liquids or food for 24 hours.  Only one of your tonsils is swollen.  Get help right away if:  You have any new symptoms such as:  Vomiting.  Very bad headache.  Stiff neck.  Chest pain.  Trouble breathing or swallowing.  You have very bad throat pain, and you also have drooling or voice changes.  You have very bad pain that is not helped by medicine.  You cannot fully open your mouth.  You have redness, swelling, or very bad pain anywhere in your neck.  Summary  Tonsillitis is an infection of the throat. It causes your tonsils to be red, tender, and swollen.  While your throat is sore, eat soft or liquid foods.  Rinse your mouth often with salt water.  Do not share cups, bottles, or other utensils until your symptoms are gone.  This information is not intended to replace advice given to you by your health care provider. Make sure you discuss any questions you have with your health care provider.  Document Revised: 05/12/2022 Document Reviewed: 05/12/2022  Else"[employer] Patient Education c 2023 [employer]

## 2024-10-23 ENCOUNTER — OFFICE VISIT (OUTPATIENT)
Dept: FAMILY MEDICINE CLINIC | Facility: CLINIC | Age: 24
End: 2024-10-23
Payer: COMMERCIAL

## 2024-10-23 VITALS
SYSTOLIC BLOOD PRESSURE: 128 MMHG | OXYGEN SATURATION: 98 % | HEART RATE: 73 BPM | HEIGHT: 68 IN | BODY MASS INDEX: 36.19 KG/M2 | WEIGHT: 238.8 LBS | TEMPERATURE: 97.4 F | DIASTOLIC BLOOD PRESSURE: 84 MMHG | RESPIRATION RATE: 20 BRPM

## 2024-10-23 DIAGNOSIS — Z00.00 WELLNESS EXAMINATION: ICD-10-CM

## 2024-10-23 DIAGNOSIS — L81.9 PIGMENTED SKIN LESION OF SUSPECTED MALIGNANT NATURE: Primary | ICD-10-CM

## 2024-10-23 DIAGNOSIS — R06.83 SNORING: ICD-10-CM

## 2024-10-23 DIAGNOSIS — K21.9 GASTROESOPHAGEAL REFLUX DISEASE, UNSPECIFIED WHETHER ESOPHAGITIS PRESENT: ICD-10-CM

## 2024-10-23 DIAGNOSIS — E66.812 CLASS 2 OBESITY DUE TO EXCESS CALORIES WITHOUT SERIOUS COMORBIDITY WITH BODY MASS INDEX (BMI) OF 36.0 TO 36.9 IN ADULT: ICD-10-CM

## 2024-10-23 DIAGNOSIS — E66.09 CLASS 2 OBESITY DUE TO EXCESS CALORIES WITHOUT SERIOUS COMORBIDITY WITH BODY MASS INDEX (BMI) OF 36.0 TO 36.9 IN ADULT: ICD-10-CM

## 2024-10-23 PROCEDURE — 99214 OFFICE O/P EST MOD 30 MIN: CPT | Performed by: NURSE PRACTITIONER

## 2024-10-23 NOTE — PROGRESS NOTES
Subjective   Chief Complaint:  Skin lesion and snoring    History of Present Illness  The patient is a 24-year-old male establishing care. He has previously been under my care, but it has been over 3 years since his last visit.    He presents today with a pigmented lesion of concern on his upper back, which his spouse noticed. They have been monitoring it, and it has increased in size.  He has a positive family history of melanoma.    He reports a recent history of snoring, which has worsened. He exhibits symptoms of sleep apnea and has positive findings on the [place] scale today. During the conversation about sleep apnea, he also mentioned experiencing nighttime awakenings, hyperacidity, and reflux. To manage these symptoms, he has been using Tums, but his heartburn has remained persistent.    Sleep Apnea Discussion:    Symptoms patient is experiencing are witnessed apnea, reflux, frequent awakenings, difficulty staying asleep, and snoring..      Patient has been experiencing symptoms for 2 years.    Pertinent negative symptoms include excessive daytime sleepiness, nasal obstruction or abnormality, dry mouth, memory loss, uncontrollable need to sleep, morning headaches, nasal abnormalities, inability to fall asleep, uncontrolled muscle weakness.    [place] Sleepiness Scale    Situation Chance of Dozing or Sleeping   Sitting and reading 1   Watching TV 3   Sitting inactive in a public place 1   Being a passenger in a motor vehicle for an hour or more 1   Lying down in the afternoon 2   Sitting and talking to someone 0   Sitting quietly after lunch (no alcohol) 1   Stopped for a few minutes in traffic while driving 0   Total Score      0 = would never doze  1 = slight change to dozing  2 = moderate chance of dozing  3 = high chance of dozing     Past Medical, Surgical, Social, and Family History:  No Known Allergies History reviewed. No pertinent past medical history. History reviewed. No pertinent surgical history.  "  Social History     Socioeconomic History    Marital status:    Tobacco Use    Smoking status: Never     Passive exposure: Never    Smokeless tobacco: Never   Vaping Use    Vaping status: Never Used   Substance and Sexual Activity    Alcohol use: Defer    Drug use: Defer    Sexual activity: Defer      Family History   Problem Relation Age of Onset    Melanoma Other        Objective   Vital Signs  /84   Pulse 73   Temp 97.4 °F (36.3 °C) (Infrared)   Resp 20   Ht 172.7 cm (68\")   Wt 108 kg (238 lb 12.8 oz)   SpO2 98%   BMI 36.31 kg/m²    Physical Exam  Constitutional:       General: He is not in acute distress.     Appearance: Normal appearance. He is obese. He is not ill-appearing, toxic-appearing or diaphoretic.   Neck:      Vascular: No carotid bruit.   Cardiovascular:      Rate and Rhythm: Normal rate and regular rhythm.      Pulses: Normal pulses.           Dorsalis pedis pulses are 2+ on the right side and 2+ on the left side.        Posterior tibial pulses are 2+ on the right side and 2+ on the left side.      Heart sounds: No murmur heard.     No friction rub. No gallop.   Pulmonary:      Effort: Pulmonary effort is normal. No respiratory distress.      Breath sounds: Normal breath sounds. No wheezing or rhonchi.   Musculoskeletal:      Right lower leg: No edema.      Left lower leg: No edema.   Skin:     Findings: Lesion (5 mm brown slightly raised lesion right upper back) present.   Neurological:      Mental Status: He is alert.       Assessment & Plan   Assessment & Plan  1. Pigmented skin lesion of suspected malignant nature.  Referral to Dermatology, Dr. Montoya.    2. Snoring.  Home sleep study.    3. GERD, unspecified whether esophagitis present.  Start Prilosec 20 mg p.o. daily. Advised on diet.    4. Wellness examination.  CBC, CMP, lipid, TSH.    5.  Obesity class II due to excess calories without serious comorbidity with a body mass index of 36-36.9 in adult.  -AVS teaching sheet " provided    Follow-up:  The patient will Return for follow-up as needed.    Records and Results Reviewed:  I reviewed current medications as given by patient and allergy list    Class 2 Severe Obesity (BMI >=35 and <=39.9). Obesity-related health conditions include the following: GERD. Obesity is newly identified. BMI is is above average; BMI management plan is completed. We discussed Information on healthy weight added to patient's after visit summary.    : Hybrid [employer]- and Dragon Speech Recognition - No recording technology was used in the exam room during encounter.    Electronically signed by LUNA Shore, 10/23/24, 4:03 PM CDT.

## 2024-12-17 ENCOUNTER — HOSPITAL ENCOUNTER (OUTPATIENT)
Dept: SLEEP CENTER | Age: 24
Discharge: HOME | End: 2024-12-19
Payer: COMMERCIAL

## 2024-12-17 PROCEDURE — G0399 HOME SLEEP TEST/TYPE 3 PORTA: HCPCS

## 2024-12-17 NOTE — PROGRESS NOTES
Pt in the office to  HST monitor.  Pt was educated on how to use equipment properly and instructed to wear for 2 nights and to return on Thursday.  Pt states he understood.

## 2024-12-18 PROCEDURE — G0399 HOME SLEEP TEST/TYPE 3 PORTA: HCPCS

## 2024-12-27 ENCOUNTER — E-VISIT (OUTPATIENT)
Dept: FAMILY MEDICINE CLINIC | Facility: TELEHEALTH | Age: 24
End: 2024-12-27

## 2024-12-27 NOTE — E-VISIT TREATED
Date: 2024 09:13:11  Clinician: Xiao Arevalo  Clinician NPI: [id number]  Patient: Kong Aldana  Patient : 2000  Patient Address: [address] [place] [address]  Patient Phone: [phone]  Visit Protocol: URI  Patient Summary:  Kong is a 24 year old ( : 2000 ) male who initiated a visit for cold, sinus infection, or influenza.     Kong states the symptoms started 1-2 days ago.   Symptom start date: 24   The symptoms consist of facial pain or   pressure, enlarged lymph nodes, a sore throat, nasal congestion, malaise, and a headache. Kong is experiencing difficulty breathing due to nasal congestion but is not short of breath.   Symptom details     Nasal secretions: The color of the mucus is green and yellow.    Sore throat: Kong reports having mild throat pain (1-3 on a 10 point pain scale), does not have exudate on the tonsils, and can swallow liquids with mild discomfort. The lymph nodes in the neck are enlarged. The lymph node swelling is not worse on one   side and the swelling has not caused changes in speech or hoarseness in the voice. A rash has not appeared on the skin since the sore throat started.     Facial pain or pressure: The facial pain or pressure feels worse when bending over or leaning forward.     Headache: The headache is mild (1-3 on a 10 point pain scale).      Kong denies having anosmia and ageusia, diarrhea, teeth pain, wheezing, chills, myalgias, nausea, ear pain, rhinitis, vomiting, cough, and fever. Kong also denies taking antibiotic medication in the past month, having recent facial or sinus surgery   in the past 60 days, and having a sinus infection within the past year.   Precipitating events  Within the past week, Kong has been exposed to someone with strep throat.    Pertinent COVID-19 (Coronavirus) information  Since the symptoms started, Kong   has not tested for COVID-19.   Kong has not had COVID-19 in the last 3 months.   Kong has not  received a COVID-19 vaccine in the past year.   Pertinent medical history     A provider has not told Kong to avoid NSAIDs.   Kong does not have diabetes.   Kong denies having immunosuppressive conditions (e.g., chemotherapy, HIV, organ transplant, long-term use of steroids or other immunosuppressive medications, splenectomy). Kong does not have asthma.   Kong denies having chronic lung disease, cystic   fibrosis, hypertension, long-term disabilities, mental health conditions, sickle cell disease or thalassemia, stroke or other cardiovascular disease, substance use disorders, or tuberculosis (TB).  Kong does not smoke or use smokeless tobacco. Kong   does not vape or use other e-cigarette products.   Weight: 230 lbs (104.33 kg)    MEDICATIONS: omeprazole oral, ALLERGIES: NKDA  Clinician Response:  Dear Kong,  Based on the information provided, you have a viral upper respiratory infection, otherwise known as a cold. Symptoms vary from person to person, but can include sneezing, coughing, a runny nose, sore throat, and headache   and range from mild to severe.  Unfortunately, there are no medications that can cure a cold, so treatment is focused on controlling symptoms as much as possible. Most people gradually feel better until symptoms are gone in 1-2 weeks.  Medication   information  Because you have a viral infection, antibiotics will not help you get better. Treating a viral infection with antibiotics could actually make you feel worse.  For more information on why I am not prescribing antibiotics, please watch this   video: Antibiotics Aren't Always the Answer.  I am prescribing:     Fluticasone 50 mcg/actuation nasal spray. Inhale 2 sprays in each nostril 1 time per day; after 1 week, may adjust to 1 - 2 sprays in each nostril 1 time per day. This medication takes several days to start working, so keep taking it even if it doesn't   help right away. There are no refills with this prescription.   Tips  for using a nasal spray:     When the medication is being sprayed in your nose, point the tip of the nasal spray towards your ear.    Do not blow your nose after using the spray.    Do not lean your head back after using the spray as it will go down your throat.    Wipe the tip of the nose piece after use with a dry, clean tissue.     Self care  Steps you can take to be as comfortable as possible:     Rest.    Drink plenty of fluids.    Take a warm shower to loosen congestion.    Use a cool-mist humidifier.    Use throat lozenges.    Suck on frozen items such as popsicles.    Drink hot tea with lemon and honey.    Gargle with warm salt water (1/4 teaspoon of salt per 8 ounce glass of water).     When to seek care  Please be seen in a clinic or urgent care if any of the following occur:   New symptoms develop, or symptoms become worse   Call 911 or go to the emergency room if any of the following occur:     Difficulty breathing    If you feel that your throat is closing off    Suddenly develop a rash    Unable to swallow fluids or are drooling     For the latest updates on COVID-19 (Coronavirus), please visit the Centers for Disease Control and Prevention (CDC). Also, your state and local health department websites may provide additional guidance regarding testing and isolation recommendations   for your location.   Diagnosis: Viral URI  Diagnosis ICD: J06.9    Follow up instructions:  ATTENTION: If you have been prescribed medications, your prescriptions will not be sent until you choose your pharmacy. To do so open the link within your notification, or go to [employer] and click eVisit in the menu to open your   treatment plan. From there, you can select your pharmacy at the bottom of your after visit summary. You can also go to [email or web address]   Prescriptions  Prescription: prednisone 10 mg oral tablets,dose pack, take tablets,dose pack  Sent To: [email or web address] - [id number] -  [address] [place] [address]  Prescription: fluticasone 50 mcg/actuation nasal spray,suspension, inhale 2 sprays in each nostril 1 time per day; after 1 week, may adjust to 1 - 2 sprays in each nostril 1 time per day.  Sent To: [facility] - [id number] - [address] [place] [address]

## 2025-01-01 DIAGNOSIS — R06.2 WHEEZING: Primary | ICD-10-CM

## 2025-01-01 DIAGNOSIS — G47.33 OSA (OBSTRUCTIVE SLEEP APNEA): ICD-10-CM

## 2025-05-01 ENCOUNTER — E-VISIT (OUTPATIENT)
Dept: FAMILY MEDICINE CLINIC | Facility: TELEHEALTH | Age: 25
End: 2025-05-01
Payer: COMMERCIAL

## 2025-05-01 PROCEDURE — FABRICHEALTHVISIT: Performed by: NURSE PRACTITIONER

## 2025-05-02 NOTE — E-VISIT TREATED
Date: 2025 21:21:26  Clinician: Reyna Mejia  Clinician NPI: [id number]  Patient: Kong Aldana  Patient : 2000  Patient Address: [address] [place] [address]  Patient Phone: [phone]  Visit Protocol: URI  Patient Summary:  Kong is a 25 year old ( : 2000 ) male who initiated a visit for cold, sinus infection, or influenza.     Kong states the symptoms started gradually 3-5 days ago.   Symptom start date: 25   The symptoms consist of   enlarged lymph nodes, a sore throat, a headache, a cough, malaise, and ear pain.   Symptom details     Cough: Kong coughs almost every minute and the cough is more bothersome at night. Phlegm comes into the throat when coughing. Kong does not believe the cough is caused by post-nasal drip. The color of the phlegm is green.     Sore throat: Kong reports having moderate throat pain (4-6 on a 10 point pain scale), does not have exudate on the tonsils, and can swallow liquids with mild discomfort. The lymph nodes in the neck are enlarged. The lymph node swelling is not worse on   one side and the swelling has caused changes in speech or hoarseness in the voice. A rash has not appeared on the skin since the sore throat started. Patient reports feeling pain in the front of the neck that sometimes moves to the ear.     Headache: The headache is mild (1-3 on a 10 point pain scale).      Kong denies having teeth pain, myalgias, fever, anosmia and ageusia, wheezing, facial pain or pressure, chills, diarrhea, nasal congestion, nausea, rhinitis, and vomiting. Kong also denies having recent facial or sinus surgery in the past 60 days,   taking antibiotic medication in the past month, experiencing difficulty opening their mouth due to pain or swelling in the jaw muscles, and double sickening (worsening symptoms after initial improvement). Kong is not experiencing dyspnea.     Precipitating events  Within the past week, Kong has not been  exposed to someone with strep throat. Kong has not recently been exposed to someone with influenza. Kong has not been in close contact with any high risk individuals.    Kong has not   received the influenza vaccination.   Pertinent COVID-19 (Coronavirus) information  Since the symptoms started, Kong has not tested for COVID-19. Kong was not able to re-test today.   Kong has not received a COVID-19 vaccine in the past year.     Pertinent medical history     A provider has not told Kong to avoid NSAIDs.   Kong does not have diabetes. Kong denies having immunosuppressive conditions (e.g., chemotherapy, HIV, organ transplant, long-term use of steroids or other immunosuppressive   medications, splenectomy). Kong does not have asthma.   Kong denies having chronic lung disease, cystic fibrosis, hypertension, long-term disabilities, mental health conditions, sickle cell disease or thalassemia, stroke or other cardiovascular   disease, substance use disorders, or tuberculosis (TB).  Kong does not smoke or use smokeless tobacco. Kong does not vape or use other e-cigarette products.   Weight: 230 lbs (104.33 kg)    MEDICATIONS: fluticasone propionate nasal, omeprazole oral, ALLERGIES: NKDA  Clinician Response:  Dear Kong,   Acute bronchitis is sudden inflammation of the main airways (bronchi) that come off the windpipe (trachea) in the lungs. The swelling causes the airways to get smaller and make more mucus than normal. This can make it   hard to breathe and can cause coughing or noisy breathing (wheezing).  Acute bronchitis may last several weeks. The cough may last longer. Allergies, asthma, and exposure to smoke may make the condition worse.  What are the causes?  This condition can be   caused by germs and by substances that irritate the lungs, including:  * Cold and flu viruses. The most common cause of this condition is the virus that causes the common cold.  * Bacteria. This is less common.  * Breathing  in substances that irritate   the lungs, including:   o Smoke from cigarettes and other forms of tobacco.   o Dust and pollen.   o Fumes from household cleaning products, gases, or burned fuel.   o Indoor or outdoor air pollution.  What increases the risk?  The following factors may   make you more likely to develop this condition:  * A weak body's defense system, also called the immune system.  * A condition that affects your lungs and breathing, such as asthma.  What are the signs or symptoms?  Common symptoms of this condition   include:  * Coughing. This may bring up clear, yellow, or green mucus from your lungs (sputum).  * Wheezing.  * Runny or stuffy nose.  * Having too much mucus in your lungs (chest congestion).  * Shortness of breath.  * Aches and pains, including sore   throat or chest.  How is this diagnosed?  This condition is usually diagnosed based on:  * Your symptoms and medical history.  * A physical exam.  You may also have other tests, including tests to rule out other conditions, such as pneumonia. These tests   include:  * A test of lung function.  * Test of a mucus sample to look for the presence of bacteria.  * Tests to check the oxygen level in your blood.  * Blood tests.  * Chest X-ray.  How is this treated?  Most cases of acute bronchitis clear up over   time without treatment. Your health care provider may recommend:  * Drinking more fluids to help thin your mucus so it is easier to cough up.  * Taking inhaled medicine (inhaler) to improve air flow in and out of your lungs.  * Using a vaporizer or a   humidifier. These are machines that add water to the air to help you breathe better.  * Taking a medicine that thins mucus and clears congestion (expectorant).  * Taking a medicine that prevents or stops coughing (cough suppressant).  It is not common to   take an antibiotic medicine for this condition.  Follow these instructions at home:     * Take over-the-counter and prescription  medicines only as told by your health care provider.  * Use an inhaler, vaporizer, or humidifier as told by your health care   provider.  * Take two teaspoons (10 mL) of honey at bedtime to lessen coughing at night.  * Drink enough fluid to keep your urine pale yellow.  * Do not use any products that contain nicotine or tobacco. These products include cigarettes, chewing   tobacco, and vaping devices, such as e-cigarettes. If you need help quitting, ask your health care provider.  * Get plenty of rest.  * Return to your normal activities as told by your health care provider. Ask your health care provider what activities   are safe for you.  * Keep all follow-up visits. This is important.  How is this prevented?   To lower your risk of getting this condition again:  * Wash your hands often with soap and water for at least 20 seconds. If soap and water are not available,   use hand .  * Avoid contact with people who have cold symptoms.  * Try not to touch your mouth, nose, or eyes with your hands.  * Avoid breathing in smoke or chemical fumes. Breathing smoke or chemical fumes will make your condition worse.    * Get the flu shot every year.  Contact a health care provider if:  * Your symptoms do not improve after 2 weeks.  * You have trouble coughing up the mucus.  * Your cough keeps you awake at night.  * You have a fever.  Get help right away if you:    * Cough up blood.  * Feel pain in your chest.  * Have severe shortness of breath.  * Faint or keep feeling like you are going to faint.  * Have a severe headache.  * Have a fever or chills that get worse.  These symptoms may represent a serious problem   that is an emergency. Do not wait to see if the symptoms will go away. Get medical help right away. Call your local emergency services (911 in the [place] Do not drive yourself to the hospital.  Summary  * Acute bronchitis is inflammation of the main   airways (bronchi) that come off the windpipe (trachea)  in the lungs. The swelling causes the airways to get smaller and make more mucus than normal.  * Drinking more fluids can help thin your mucus so it is easier to cough up.  * Take over-the-counter   and prescription medicines only as told by your health care provider.  * Do not use any products that contain nicotine or tobacco. These products include cigarettes, chewing tobacco, and vaping devices, such as e-cigarettes. If you need help quitting,   ask your health care provider.  * Contact a health care provider if your symptoms do not improve after 2 weeks.  This information is not intended to replace advice given to you by your health care provider. Make sure you discuss any questions you have   with your health care provider.       Diagnosis: Unspecified acute lower respiratory infection  Diagnosis ICD: J22    Follow up instructions: ATTENTION: If you have been prescribed medications, your prescriptions will not be sent until you choose your pharmacy.  To do so open the link within your notification, or go to [facility] and click eVisit in the menu to open your   treatment plan. From there, you can select your pharmacy at the bottom of your after visit summary. You can also go to [email or web address]  Prescriptions  Prescription: ibuprofen (IBU) 800 mg oral tablet, take 1 tablet by mouth 3 times per day  Sent To: [facility] - [id number] - [address] [place] [address]  Prescription: benzonatate 200 mg oral capsule, take 1 capsule by mouth 3 times per day as needed for cough  Sent To: [facility] - [id number] - [address] [place] [address]  Prescription: azithromycin (Zithromax Z-Cole) 250 mg oral tablet, take 2 tablets by mouth on day 1, and 1 tablet by mouth on days 2-5  Sent To: [facility] - [id number] - [address] [place] [address]